# Patient Record
Sex: MALE | Race: WHITE | NOT HISPANIC OR LATINO | URBAN - METROPOLITAN AREA
[De-identification: names, ages, dates, MRNs, and addresses within clinical notes are randomized per-mention and may not be internally consistent; named-entity substitution may affect disease eponyms.]

---

## 2021-06-29 ENCOUNTER — PATIENT OUTREACH (OUTPATIENT)
Dept: HEALTH INFORMATION MANAGEMENT | Facility: OTHER | Age: 81
End: 2021-06-29

## 2021-06-29 NOTE — PROGRESS NOTES
Outbound call to PT- Msg via MS Teams that PT returned my call while I was on another call.  Warm transfer to [facility] for ADELSO home visit.

## 2021-06-29 NOTE — PROGRESS NOTES
Outcome: Left Message    Called PT to schedule ADELSO.   LVM requesting a call back.    Please transfer to Patient Outreach Team at [phone] when patient returns call.      Attempt # 1

## 2021-08-03 PROBLEM — M19.90 ARTHRITIS: Status: ACTIVE | Noted: 2021-08-03

## 2021-12-29 ENCOUNTER — TELEPHONE (OUTPATIENT)
Dept: HEALTH INFORMATION MANAGEMENT | Facility: OTHER | Age: 81
End: 2021-12-29

## 2021-12-29 NOTE — TELEPHONE ENCOUNTER
TCN to member today regarding recent admission. Stated he was doing pretty good regarding the stroke. Some slight weakness to his right side still. He has seen his PCP although was also tested for COVID due to symptoms but was negative. Feeling better now just no worsening of cold symptoms. Understood DC instructions, no questions or concerns. Instructed him to call HTH/SCP if he has any needs or questions in the future he can ask for a .

## 2021-12-30 ENCOUNTER — TELEPHONE (OUTPATIENT)
Dept: HEALTH INFORMATION MANAGEMENT | Facility: OTHER | Age: 81
End: 2021-12-30

## 2022-05-19 PROBLEM — D69.2 PURPURA (HCC): Status: ACTIVE | Noted: 2022-05-19

## 2022-05-19 PROBLEM — D68.59 HYPERCOAGULABLE STATE (HCC): Status: ACTIVE | Noted: 2022-05-19

## 2022-05-19 PROBLEM — I48.0 PAROXYSMAL ATRIAL FIBRILLATION (HCC): Status: ACTIVE | Noted: 2022-05-19

## 2022-12-02 ENCOUNTER — DOCUMENTATION (OUTPATIENT)
Dept: HEALTH INFORMATION MANAGEMENT | Facility: OTHER | Age: 82
End: 2022-12-02

## 2023-05-19 ENCOUNTER — HOME HEALTH ADMISSION (OUTPATIENT)
Dept: HOME HEALTH SERVICES | Facility: HOME HEALTHCARE | Age: 83
End: 2023-05-19
Payer: MEDICARE

## 2023-05-24 ENCOUNTER — HOME CARE VISIT (OUTPATIENT)
Dept: HOME HEALTH SERVICES | Facility: HOME HEALTHCARE | Age: 83
End: 2023-05-24
Payer: MEDICARE

## 2023-05-24 PROCEDURE — 665001 SOC-HOME HEALTH

## 2023-05-24 PROCEDURE — G0493 RN CARE EA 15 MIN HH/HOSPICE: HCPCS

## 2023-05-24 ASSESSMENT — ENCOUNTER SYMPTOMS
LAST BOWEL MOVEMENT: 66618
PAIN: 1
HYPERTENSION: 1
VOMITING: DENIES
DYSPNEA ON EXERTION: 1
PERSON REPORTING PAIN: PATIENT
PALPITATIONS: 1
NAUSEA: DENIES
FATIGUES EASILY: 1

## 2023-05-25 ENCOUNTER — HOME CARE VISIT (OUTPATIENT)
Dept: HOME HEALTH SERVICES | Facility: HOME HEALTHCARE | Age: 83
End: 2023-05-25
Payer: MEDICARE

## 2023-05-25 VITALS
HEART RATE: 77 BPM | DIASTOLIC BLOOD PRESSURE: 86 MMHG | RESPIRATION RATE: 16 BRPM | SYSTOLIC BLOOD PRESSURE: 130 MMHG | OXYGEN SATURATION: 99 % | TEMPERATURE: 97.4 F

## 2023-05-25 VITALS
TEMPERATURE: 97.7 F | DIASTOLIC BLOOD PRESSURE: 72 MMHG | HEART RATE: 82 BPM | OXYGEN SATURATION: 96 % | RESPIRATION RATE: 16 BRPM | SYSTOLIC BLOOD PRESSURE: 135 MMHG

## 2023-05-25 PROCEDURE — G0151 HHCP-SERV OF PT,EA 15 MIN: HCPCS

## 2023-05-25 SDOH — ECONOMIC STABILITY: HOUSING INSECURITY
HOME SAFETY: PT RECOMMENDED REMOVAL OF THROW RUGS IN BATHROOM AND GRAB BAR/RAILING AT STEPS FOR FALL PREVENTION.  PATIENT ABLE TO SPEAKBACK VERBAL UNDERSTANDING

## 2023-05-25 ASSESSMENT — ENCOUNTER SYMPTOMS
PAIN LOCATION - PAIN SEVERITY: 3/10
SUBJECTIVE PAIN PROGRESSION: WAXING AND WANING
PAIN LOCATION - PAIN QUALITY: DULL ACHY SHARP
PAIN LOCATION - PAIN FREQUENCY: FREQUENT
PAIN LOCATION - PAIN SEVERITY: 0/10
PAIN LOCATION: RIGHT HIP
PAIN LOCATION - RELIEVING FACTORS: REST, PAIN MEDICATION
PAIN LOCATION - PAIN FREQUENCY: INTERMITTENT
PAIN LOCATION - PAIN DURATION: CHRONIC
PERSON REPORTING PAIN: PATIENT
PAIN LOCATION - PAIN DURATION: DAILY
HIGHEST PAIN SEVERITY IN PAST 24 HOURS: 5/10
PAIN SEVERITY GOAL: 1/10
PAIN LOCATION - PAIN QUALITY: ACHY
PAIN LOCATION: RT HIP
PAIN LOCATION - EXACERBATING FACTORS: WEIGHT BEARING ACTIVITY
LOWEST PAIN SEVERITY IN PAST 24 HOURS: 2/10
SUBJECTIVE PAIN PROGRESSION: GRADUALLY IMPROVING
HIGHEST PAIN SEVERITY IN PAST 24 HOURS: 3/10
LOWEST PAIN SEVERITY IN PAST 24 HOURS: 0/10
PAIN: 1
PAIN SEVERITY GOAL: 3/10

## 2023-05-25 ASSESSMENT — ACTIVITIES OF DAILY LIVING (ADL)
BATHING_CURRENT_FUNCTION: MODERATE ASSIST
AMBULATION ASSISTANCE: MINIMUM ASSIST
AMBULATION ASSISTANCE ON FLAT SURFACES: 1
AMBULATION ASSISTANCE: 1
AMBULATION_DISTANCE/DURATION_TOLERATED: 100 FEET
BATHING ASSESSED: 1

## 2023-05-25 NOTE — CASE COMMUNICATION
PT evaluation completed on 5/25/23.  Frequency:   1 week 1, 2 week 3,  Effective 5/25/23.  Please assist with authorization as needed.

## 2023-05-26 NOTE — CASE COMMUNICATION
Kayy- please forward this message to pts PCP  PRIMARY DIAGNOSIS: rt hip  SKILLED NEED: fall prevention, medication management, health assessment, wound care    NURSING FREQUENCy: 1w1, 3prn  ZIPE CODE: [id number]  DISCIPLINES ORDERED: PT OT  INSURANCE: [facility]  CERTIFICATION PERIOD: 5/24-7/22  SPECIAL CONSIDERATION: none  Case communication to HH attending provider and P jose anti    Opened patient to [facility] medication reconcili ation process done. Medication list left in home care folder. See MAR for drug allergy interactions. There are no major drug to drug interactions.  The best contact phone number is [phone] and pt manages the medications.

## 2023-05-30 ENCOUNTER — HOME CARE VISIT (OUTPATIENT)
Dept: HOME HEALTH SERVICES | Facility: HOME HEALTHCARE | Age: 83
End: 2023-05-30
Payer: MEDICARE

## 2023-05-30 VITALS
TEMPERATURE: 97.5 F | OXYGEN SATURATION: 98 % | RESPIRATION RATE: 16 BRPM | DIASTOLIC BLOOD PRESSURE: 72 MMHG | HEART RATE: 77 BPM | SYSTOLIC BLOOD PRESSURE: 128 MMHG

## 2023-05-30 PROBLEM — Z96.641 STATUS POST TOTAL REPLACEMENT OF RIGHT HIP: Status: ACTIVE | Noted: 2023-05-30

## 2023-05-30 PROBLEM — L89.151 PRESSURE INJURY OF SACRAL REGION, STAGE 1: Status: ACTIVE | Noted: 2023-05-30

## 2023-05-30 PROCEDURE — G0151 HHCP-SERV OF PT,EA 15 MIN: HCPCS

## 2023-05-30 ASSESSMENT — ENCOUNTER SYMPTOMS
PAIN LOCATION - PAIN DURATION: DAILY
PAIN LOCATION - PAIN QUALITY: ACHING
HIGHEST PAIN SEVERITY IN PAST 24 HOURS: 4/10
PAIN LOCATION - RELIEVING FACTORS: PAIN MEDS, REST, REPOSITIONING
PAIN LOCATION - PAIN FREQUENCY: INTERMITTENT
PERSON REPORTING PAIN: PATIENT
PAIN: 1
SUBJECTIVE PAIN PROGRESSION: WAXING AND WANING
LOWEST PAIN SEVERITY IN PAST 24 HOURS: 0/10
PAIN SEVERITY GOAL: 0/10
PAIN LOCATION - PAIN SEVERITY: 4/10
PAIN LOCATION: RIGHT HIP

## 2023-06-01 ENCOUNTER — HOME CARE VISIT (OUTPATIENT)
Dept: HOME HEALTH SERVICES | Facility: HOME HEALTHCARE | Age: 83
End: 2023-06-01
Payer: MEDICARE

## 2023-06-01 VITALS
RESPIRATION RATE: 16 BRPM | HEART RATE: 72 BPM | DIASTOLIC BLOOD PRESSURE: 64 MMHG | OXYGEN SATURATION: 98 % | TEMPERATURE: 97.5 F | SYSTOLIC BLOOD PRESSURE: 102 MMHG

## 2023-06-01 PROCEDURE — G0152 HHCP-SERV OF OT,EA 15 MIN: HCPCS

## 2023-06-01 PROCEDURE — G0151 HHCP-SERV OF PT,EA 15 MIN: HCPCS

## 2023-06-01 ASSESSMENT — ENCOUNTER SYMPTOMS
PAIN LOCATION - PAIN QUALITY: ACHING
PAIN LOCATION: RIGHT LEG
PAIN LOCATION - PAIN SEVERITY: 3/10
PAIN LOCATION - PAIN DURATION: DAILY
PAIN LOCATION - PAIN FREQUENCY: INTERMITTENT
PERSON REPORTING PAIN: PATIENT
PAIN LOCATION - RELIEVING FACTORS: PAIN MEDS, REST, REPOSITIONING
PAIN: 1
PAIN LOCATION - EXACERBATING FACTORS: STANDING, WALKING

## 2023-06-01 ASSESSMENT — ACTIVITIES OF DAILY LIVING (ADL): OASIS_M1830: 03

## 2023-06-01 NOTE — CASE COMMUNICATION
"Quality Review for SOC OASIS by SUMI Cramer RN on  June 1, 2023     Edits completed by SUMI Cramer RN:  1.  and  diagnosis coding updated per chart review.  2.  patient living situation, changed availability of assistance to occasional/ short term assistance per your documentation.  3. , ethnicity changed to \"patient declines to respond\" as \"patient unable to respond implies that the patient is unable to comm unicate  4. Per narrative that patient needs min to moderate assistance and a walker for safety, the following changes were made: , , , ,  are 2;  and  are 3  5. Functional limitations, checked incontinence  6. Safety measures checked ambulate only with assistance  7.  is 8 per care plan therapy sets.   "

## 2023-06-01 NOTE — CASE COMMUNICATION
"i agree with this  ----- Message -----  From: Little Cramer R.N.  Sent: 6/1/2023   7:21 AM PDT  To: Jazmin Pinzon R.N.      Quality Review for SOC OASIS by SUMI Cramer RN on  June 1, 2023     Edits completed by SUMI Cramer RN:  1.  and  diagnosis coding updated per chart review.  2.  patient living situation, changed availability of assistance to occasional/ short term assistance per your documentati on.  3. , ethnicity changed to \"patient declines to respond\" as \"patient unable to respond implies that the patient is unable to communicate  4. Per narrative that patient needs min to moderate assistance and a walker for safety, the following changes were made: , , , ,  are 2;  and  are 3  5. Functional limitations, checked incontinence  6. Safety measures checked ambulate only with assistance  7 .  is 8 per care plan therapy sets.   "

## 2023-06-04 ASSESSMENT — ACTIVITIES OF DAILY LIVING (ADL)
TOILETING: INDEPENDENT
HOUSEKEEPING ASSESSED: 1
DRESSING_LB_CURRENT_FUNCTION: INDEPENDENT
FEEDING_WITHIN_DEFINED_LIMITS: 1
TOILETING: 1
GROOMING ASSESSED: 1
GROOMING_WITHIN_DEFINED_LIMITS: 1
CURRENT_FUNCTION: INDEPENDENT
AMBULATION ASSISTANCE: 1
FEEDING: INDEPENDENT
FEEDING ASSESSED: 1
ORAL_CARE_CURRENT_FUNCTION: INDEPENDENT
LIGHT HOUSEKEEPING: NEEDS ASSISTANCE
PHYSICAL TRANSFERS ASSESSED: 1
SHOPPING: NEEDS ASSISTANCE
LAUNDRY: NEEDS ASSISTANCE
AMBULATION ASSISTANCE: INDEPENDENT
PREPARING MEALS: INDEPENDENT
DRESSING_UB_CURRENT_FUNCTION: INDEPENDENT
LAUNDRY ASSESSED: 1
SHOPPING ASSESSED: 1
ORAL_CARE_ASSESSED: 1
GROOMING_CURRENT_FUNCTION: INDEPENDENT

## 2023-06-04 ASSESSMENT — ENCOUNTER SYMPTOMS
DENIES PAIN: 1
PERSON REPORTING PAIN: PATIENT

## 2023-06-05 ENCOUNTER — HOME CARE VISIT (OUTPATIENT)
Dept: HOME HEALTH SERVICES | Facility: HOME HEALTHCARE | Age: 83
End: 2023-06-05
Payer: MEDICARE

## 2023-06-05 VITALS
OXYGEN SATURATION: 94 % | SYSTOLIC BLOOD PRESSURE: 110 MMHG | DIASTOLIC BLOOD PRESSURE: 60 MMHG | RESPIRATION RATE: 16 BRPM | TEMPERATURE: 97.7 F | HEART RATE: 76 BPM

## 2023-06-05 PROCEDURE — G0152 HHCP-SERV OF OT,EA 15 MIN: HCPCS

## 2023-06-05 PROCEDURE — G0151 HHCP-SERV OF PT,EA 15 MIN: HCPCS

## 2023-06-05 ASSESSMENT — ENCOUNTER SYMPTOMS
PAIN LOCATION - PAIN QUALITY: ACHING
PAIN LOCATION - EXACERBATING FACTORS: STANDING, WALKING
PAIN LOCATION - PAIN FREQUENCY: INTERMITTENT
LOWEST PAIN SEVERITY IN PAST 24 HOURS: 0/10
PAIN LOCATION - RELIEVING FACTORS: PAIN MEDS, REST, REPOSITIONING
PAIN LOCATION: RIGHT HIP
PAIN LOCATION - PAIN SEVERITY: 2/10
PAIN LOCATION - PAIN DURATION: DAILY
HIGHEST PAIN SEVERITY IN PAST 24 HOURS: 4/10
PAIN SEVERITY GOAL: 0/10
PERSON REPORTING PAIN: PATIENT
PAIN: 1

## 2023-06-07 VITALS
DIASTOLIC BLOOD PRESSURE: 60 MMHG | OXYGEN SATURATION: 94 % | SYSTOLIC BLOOD PRESSURE: 110 MMHG | HEART RATE: 76 BPM | RESPIRATION RATE: 16 BRPM

## 2023-06-07 ASSESSMENT — ENCOUNTER SYMPTOMS
PERSON REPORTING PAIN: PATIENT
DENIES PAIN: 1

## 2023-06-08 ENCOUNTER — HOME CARE VISIT (OUTPATIENT)
Dept: HOME HEALTH SERVICES | Facility: HOME HEALTHCARE | Age: 83
End: 2023-06-08
Payer: MEDICARE

## 2023-06-08 VITALS
HEART RATE: 85 BPM | TEMPERATURE: 97.4 F | OXYGEN SATURATION: 96 % | SYSTOLIC BLOOD PRESSURE: 112 MMHG | RESPIRATION RATE: 16 BRPM | DIASTOLIC BLOOD PRESSURE: 58 MMHG

## 2023-06-08 PROCEDURE — G0151 HHCP-SERV OF PT,EA 15 MIN: HCPCS

## 2023-06-08 ASSESSMENT — ENCOUNTER SYMPTOMS
LOWEST PAIN SEVERITY IN PAST 24 HOURS: 0/10
PAIN SEVERITY GOAL: 0/10
PAIN LOCATION - PAIN SEVERITY: 2/10
PAIN LOCATION - PAIN DURATION: DAILY
SUBJECTIVE PAIN PROGRESSION: WAXING AND WANING
PAIN LOCATION - PAIN FREQUENCY: INTERMITTENT
PAIN LOCATION: RIGHT HIP
PERSON REPORTING PAIN: PATIENT
PAIN LOCATION - RELIEVING FACTORS: PAIN MEDS, REST, REPOSITIONING
PAIN LOCATION - PAIN QUALITY: ACHING
HIGHEST PAIN SEVERITY IN PAST 24 HOURS: 4/10
PAIN: 1
PAIN LOCATION - EXACERBATING FACTORS: STANDING, WALKING

## 2023-06-12 ENCOUNTER — HOME CARE VISIT (OUTPATIENT)
Dept: HOME HEALTH SERVICES | Facility: HOME HEALTHCARE | Age: 83
End: 2023-06-12
Payer: MEDICARE

## 2023-06-12 VITALS
SYSTOLIC BLOOD PRESSURE: 116 MMHG | TEMPERATURE: 97.7 F | DIASTOLIC BLOOD PRESSURE: 62 MMHG | HEART RATE: 75 BPM | OXYGEN SATURATION: 96 % | RESPIRATION RATE: 16 BRPM

## 2023-06-12 PROCEDURE — G0151 HHCP-SERV OF PT,EA 15 MIN: HCPCS

## 2023-06-12 ASSESSMENT — ENCOUNTER SYMPTOMS
PAIN LOCATION - RELIEVING FACTORS: PAIN MEDS, REST, REPOSITIONING
PAIN LOCATION - PAIN DURATION: DAILY
PAIN SEVERITY GOAL: 0/10
PAIN LOCATION: BACK
PAIN LOCATION - EXACERBATING FACTORS: STANDING, WALKING
PAIN: 1
PAIN LOCATION - PAIN QUALITY: ACHING
PAIN LOCATION - PAIN FREQUENCY: INTERMITTENT
SUBJECTIVE PAIN PROGRESSION: WAXING AND WANING
PERSON REPORTING PAIN: PATIENT
LOWEST PAIN SEVERITY IN PAST 24 HOURS: 0/10
PAIN LOCATION - PAIN SEVERITY: 3/10
HIGHEST PAIN SEVERITY IN PAST 24 HOURS: 3/10

## 2023-06-15 ENCOUNTER — HOME CARE VISIT (OUTPATIENT)
Dept: HOME HEALTH SERVICES | Facility: HOME HEALTHCARE | Age: 83
End: 2023-06-15
Payer: MEDICARE

## 2023-06-15 VITALS
OXYGEN SATURATION: 99 % | SYSTOLIC BLOOD PRESSURE: 116 MMHG | DIASTOLIC BLOOD PRESSURE: 62 MMHG | HEART RATE: 72 BPM | TEMPERATURE: 97.7 F | RESPIRATION RATE: 16 BRPM

## 2023-06-15 PROCEDURE — G0151 HHCP-SERV OF PT,EA 15 MIN: HCPCS

## 2023-06-15 ASSESSMENT — ENCOUNTER SYMPTOMS
PAIN LOCATION - PAIN QUALITY: ACHING
PAIN LOCATION - PAIN DURATION: DAILY
PAIN SEVERITY GOAL: 0/10
PAIN LOCATION - RELIEVING FACTORS: PAIN MEDS, REST, REPOSITIONING
PERSON REPORTING PAIN: PATIENT
PAIN LOCATION - PAIN FREQUENCY: INTERMITTENT
PAIN LOCATION - PAIN SEVERITY: 4/10
LOWEST PAIN SEVERITY IN PAST 24 HOURS: 0/10
SUBJECTIVE PAIN PROGRESSION: WAXING AND WANING
HIGHEST PAIN SEVERITY IN PAST 24 HOURS: 5/10
PAIN: 1
PAIN LOCATION - EXACERBATING FACTORS: STANDING, WALKING
PAIN LOCATION: RIGHT HIP

## 2023-06-15 ASSESSMENT — BALANCE ASSESSMENTS
ARISES: 1 - ABLE, USES ARMS TO HELP
NUDGED: 1 - STAGGERS, GRABS, CATCHES SELF
ATTEMPTS TO ARISE: 2 - ABLE TO RISE, ONE ATTEMPT
EYES CLOSED AT MAXIMUM POSITION NUDGED: 0 - UNSTEADY
ARISING SCORE: 1
TURNING 360 DEGREES STEPS: 1 - CONTINUOUS STEPS
SITTING DOWN: 1 - USES ARMS OR NOT SMOOTH MOTION
BALANCE SCORE: 11
SITTING BALANCE: 1 - STEADY, SAFE
STANDING BALANCE: 1 - STEADY BUT WIDE STANCE AND USES CANE OR OTHER SUPPORT
IMMEDIATE STANDING BALANCE FIRST 5 SECONDS: 2 - STEADY WITHOUT WALKER OR OTHER SUPPORT
NUDGED SCORE: 1

## 2023-06-15 ASSESSMENT — ACTIVITIES OF DAILY LIVING (ADL)
HOME_HEALTH_OASIS: 00
OASIS_M1830: 01

## 2023-06-15 ASSESSMENT — PATIENT HEALTH QUESTIONNAIRE - PHQ9: CLINICAL INTERPRETATION OF PHQ2 SCORE: 0

## 2023-06-15 ASSESSMENT — GAIT ASSESSMENTS
STEP SYMMETRY: 1 - RIGHT AND LEFT STEP LENGTH APPEAR EQUAL
TRUNK SCORE: 1
STEP CONTINUITY: 1 - STEPS APPEAR CONTINUOUS
WALKING STANCE: 0 - HEELS APART
BALANCE AND GAIT SCORE: 20
PATH: 1 - MILD/MODERATE DEVIATION OR USES WALKING AID
PATH SCORE: 1
GAIT SCORE: 9
TRUNK: 1 - NO SWAY BUT FLEXION OF KNEES OR BACK OR SPREADS ARMS WHILE WALKING
INITIATION OF GAIT IMMEDIATELY AFTER GO: 1 - NO HESITANCY

## 2023-06-16 ENCOUNTER — HOME CARE VISIT (OUTPATIENT)
Dept: HOME HEALTH SERVICES | Facility: HOME HEALTHCARE | Age: 83
End: 2023-06-16
Payer: MEDICARE

## 2023-06-16 NOTE — CASE COMMUNICATION
Quality Review Completed for DC OASIS by RODRIGUEZ Burgess RN on 6/16/2023:     Edits completed by RODRIGUEZ Burgess RN:  1.  E. Anticoagulant is taking Xarelto with indication per MAR

## 2023-06-16 NOTE — Clinical Note
I agree with these changes.  Thank you.    ----- Message -----  From: Amy Burgess R.N.  Sent: 6/16/2023  11:39 AM PDT  To: Cinthya Ramirez, PT      Quality Review Completed for DC OASIS by RODRIGUEZ Burgess RN on 6/16/2023:     Edits completed by RODRIGUEZ Burgess RN:  1.  E. Anticoagulant is taking Xarelto with indication per MAR

## 2023-10-10 ENCOUNTER — DOCUMENTATION (OUTPATIENT)
Dept: HEALTH INFORMATION MANAGEMENT | Facility: OTHER | Age: 83
End: 2023-10-10
Payer: MEDICARE

## 2024-05-16 ENCOUNTER — HOME HEALTH ADMISSION (OUTPATIENT)
Dept: HOME HEALTH SERVICES | Facility: HOME HEALTHCARE | Age: 84
End: 2024-05-16
Payer: MEDICARE

## 2024-05-23 ENCOUNTER — DOCUMENTATION (OUTPATIENT)
Dept: MEDICAL GROUP | Facility: PHYSICIAN GROUP | Age: 84
End: 2024-05-23
Payer: MEDICARE

## 2024-05-23 ENCOUNTER — HOME CARE VISIT (OUTPATIENT)
Dept: HOME HEALTH SERVICES | Facility: HOME HEALTHCARE | Age: 84
End: 2024-05-23
Payer: MEDICARE

## 2024-05-23 VITALS
TEMPERATURE: 97.7 F | WEIGHT: 187 LBS | HEIGHT: 72 IN | RESPIRATION RATE: 16 BRPM | HEART RATE: 85 BPM | OXYGEN SATURATION: 99 % | DIASTOLIC BLOOD PRESSURE: 68 MMHG | BODY MASS INDEX: 25.33 KG/M2 | SYSTOLIC BLOOD PRESSURE: 116 MMHG

## 2024-05-23 PROCEDURE — 665005 NO-PAY RAP - HOME HEALTH

## 2024-05-23 ASSESSMENT — ENCOUNTER SYMPTOMS
LOWEST PAIN SEVERITY IN PAST 24 HOURS: 0/10
STOOL FREQUENCY: LESS THAN DAILY
PAIN LOCATION: LEFT HIP
PAIN SEVERITY GOAL: 0/10
PAIN LOCATION - EXACERBATING FACTORS: ACTIVITY
VOMITING: DENIES
PAIN LOCATION - PAIN QUALITY: ACHE
CONSTIPATION: 1
PAIN LOCATION - PAIN FREQUENCY: INTERMITTENT
PERSON REPORTING PAIN: PATIENT
HIGHEST PAIN SEVERITY IN PAST 24 HOURS: 6/10
PAIN LOCATION - PAIN DURATION: CHRONIC
LAST BOWEL MOVEMENT: 66982
NAUSEA: DENIES
PAIN: 1
PAIN LOCATION - PAIN SEVERITY: 1/10
SUBJECTIVE PAIN PROGRESSION: GRADUALLY IMPROVING

## 2024-05-23 ASSESSMENT — ACTIVITIES OF DAILY LIVING (ADL): OASIS_M1830: 02

## 2024-05-23 ASSESSMENT — FIBROSIS 4 INDEX: FIB4 SCORE: 3.2

## 2024-05-23 NOTE — PROGRESS NOTES
Medication chart review for [facility] services    Received referral from [facility].   Medications reviewed  compared with discharge summary if available.  Discharge summary date, if applicable:   5/3    Current medication list per [facility]     Medication list one, patient is currently taking    Current Outpatient Medications:     oxyCODONE CR, 10 mg, Oral, 4X/DAY PRN    amoxicillin, 2,000 mg, Oral, QDAY PRN    tamsulosin, 0.4 mg, Oral, DAILY    gabapentin, 300 mg, Oral, TID    bisacodyl, 5 mg, Oral, Q7 DAYS    docusate sodium, 200 mg, Oral, DAILY    Pro Nutrients Fruit & Veggie, 2 Tablet, Oral, DAILY    Probiotic Product (PROBIOTIC BLEND PO), 60 Billion Cells, Oral, DAILY    Glucos-Chondroit-Collag-Hyal (JOINT SUPPORT FORMULA PO), 1 Tablet, Oral, DAILY    Aloe Vera, 10,000 mg, Oral, DAILY    vitamin D3, 1,000 Units, Oral, DAILY    calcium carbonate, 1,000 mg, Oral, 4X/DAY PRN    docosahexanoic acid, 1,400 mg, Oral, DAILY    acetaminophen, 500 mg, Oral, Q6HRS PRN    oxyCODONE-acetaminophen, TAKE 1 TABLET BY MOUTH EVERY 6 HOURS AS NEEDED FOR MODERATE OR SEVERE PAIN FOR UP TO 7 DAYS.    One-A-Day Mens 50+ Advantage, 1 Tablet, Oral, DAILY    Lecithin, 2 Capsule, Oral, BID    vitamin e, 1 Capsule, Oral, DAILY    rivaroxaban, 20 mg, Oral, PM MEAL      Medication list two, drugs that the patient has been prescribed or recommended to take by their healthcare provider on discharge summary  MEDICATIONS:    Include Flomax 0.4 mg p.o. daily, Hui-Colace 8.6-50 mg 2 tablets daily, Xarelto 20 mg p.o. at dinner, gabapentin 200 mg p.o. b.i.d.  He has been placed on oxycodone for pain.       Allergies   Allergen Reactions    Iodine      rash with topical iodine, states has had iodine for contrast without issue       Labs     Lab Results   Component Value Date/Time    SODIUM 142 10/16/2023 05:47 AM    POTASSIUM 4.3 10/16/2023 05:47 AM    CHLORIDE 105 10/16/2023 05:47 AM    CO2 26 10/16/2023 05:47 AM    GLUCOSE 99  10/16/2023 05:47 AM    BUN 15 10/16/2023 05:47 AM    CREATININE 0.65 (L) 10/16/2023 05:47 AM    BUNCREATRAT 23 10/16/2023 05:47 AM    GLOMRATE 99 03/30/2023 09:57 AM     Lab Results   Component Value Date/Time    ALKPHOSPHAT 75 10/16/2023 05:47 AM    ASTSGOT 19 10/16/2023 05:47 AM    ALTSGPT 15 10/16/2023 05:47 AM    TBILIRUBIN 1.0 10/16/2023 05:47 AM    ALBUMIN 4.2 10/16/2023 05:47 AM        Assessment for clinically significant drug interactions, drug omissions/additions, duplicative therapies.            CC   Richard Zhang M.D.  [address] [place] [id number]  Fax: [phone]    [facility]  Phone [phone] fax [phone]    This note was created using voice recognition software (Dragon). The accuracy of the dictation is limited by the abilities of the software. I have reviewed the note prior to signing, however some errors in grammar and context are still possible. If you have any questions related to this note please do not hesitate to contact our office.

## 2024-05-28 ENCOUNTER — HOME CARE VISIT (OUTPATIENT)
Dept: HOME HEALTH SERVICES | Facility: HOME HEALTHCARE | Age: 84
End: 2024-05-28
Payer: MEDICARE

## 2024-05-28 VITALS
HEART RATE: 73 BPM | OXYGEN SATURATION: 98 % | DIASTOLIC BLOOD PRESSURE: 62 MMHG | RESPIRATION RATE: 16 BRPM | TEMPERATURE: 97.4 F | SYSTOLIC BLOOD PRESSURE: 112 MMHG

## 2024-05-28 ASSESSMENT — ACTIVITIES OF DAILY LIVING (ADL)
GROOMING_COMMENTS: SEE OT NOTES
FEEDING_COMMENTS: SEE OT NOTES
BATHING_COMMENTS: SEE OT NOTES
CURRENT_FUNCTION: SUPERVISION
AMBULATION ASSISTANCE: 1
PHYSICAL_TRANSFERS_DEVICES: USE OF B UES TO ASSIST
PHYSICAL TRANSFERS ASSESSED: 1
AMBULATION ASSISTANCE ON FLAT SURFACES: 1
AMBULATION ASSISTANCE: STAND BY ASSIST

## 2024-05-28 ASSESSMENT — GAIT ASSESSMENTS
STEP SYMMETRY: 1 - RIGHT AND LEFT STEP LENGTH APPEAR EQUAL
TRUNK: 0 - MARKED SWAY OR USES WALKING AID
TRUNK SCORE: 0
PATH: 1 - MILD/MODERATE DEVIATION OR USES WALKING AID
STEP CONTINUITY: 1 - STEPS APPEAR CONTINUOUS
GAIT SCORE: 8
INITIATION OF GAIT IMMEDIATELY AFTER GO: 1 - NO HESITANCY
BALANCE AND GAIT SCORE: 17
PATH SCORE: 1
WALKING STANCE: 0 - HEELS APART

## 2024-05-28 ASSESSMENT — ENCOUNTER SYMPTOMS
PAIN: 1
LOWEST PAIN SEVERITY IN PAST 24 HOURS: 0/10
PAIN LOCATION - RELIEVING FACTORS: PAIN MEDS, REST, REPOSITIONING
PAIN LOCATION - PAIN QUALITY: ACHING
PAIN LOCATION - PAIN DURATION: DAILY
MUSCLE WEAKNESS: 1
PERSON REPORTING PAIN: PATIENT
PAIN LOCATION - PAIN SEVERITY: 1/10
PAIN LOCATION: LEFT HIP
LIMITED RANGE OF MOTION: 1
PAIN LOCATION - EXACERBATING FACTORS: STANDING OR WALKING TOO MUCH
HIGHEST PAIN SEVERITY IN PAST 24 HOURS: 3/10
PAIN SEVERITY GOAL: 0/10
SUBJECTIVE PAIN PROGRESSION: WAXING AND WANING
PAIN LOCATION - PAIN FREQUENCY: INTERMITTENT

## 2024-05-28 ASSESSMENT — BALANCE ASSESSMENTS
TURNING 360 DEGREES STEPS: 1 - CONTINUOUS STEPS
EYES CLOSED AT MAXIMUM POSITION NUDGED: 0 - UNSTEADY
NUDGED: 1 - STAGGERS, GRABS, CATCHES SELF
BALANCE SCORE: 9
NUDGED SCORE: 1
ARISING SCORE: 1
ATTEMPTS TO ARISE: 2 - ABLE TO RISE, ONE ATTEMPT
STANDING BALANCE: 1 - STEADY BUT WIDE STANCE AND USES CANE OR OTHER SUPPORT
IMMEDIATE STANDING BALANCE FIRST 5 SECONDS: 1 - STEADY BUT USES WALKER OR OTHER SUPPORT
ARISES: 1 - ABLE, USES ARMS TO HELP
SITTING BALANCE: 1 - STEADY, SAFE
SITTING DOWN: 1 - USES ARMS OR NOT SMOOTH MOTION

## 2024-05-29 ENCOUNTER — HOME CARE VISIT (OUTPATIENT)
Dept: HOME HEALTH SERVICES | Facility: HOME HEALTHCARE | Age: 84
End: 2024-05-29
Payer: MEDICARE

## 2024-05-29 VITALS
SYSTOLIC BLOOD PRESSURE: 126 MMHG | RESPIRATION RATE: 16 BRPM | DIASTOLIC BLOOD PRESSURE: 68 MMHG | OXYGEN SATURATION: 97 % | HEART RATE: 83 BPM | TEMPERATURE: 97.5 F

## 2024-05-29 ASSESSMENT — ACTIVITIES OF DAILY LIVING (ADL)
FEEDING: INDEPENDENT
ORAL_CARE_CURRENT_FUNCTION: INDEPENDENT
AMBULATION ASSISTANCE: 1
LAUNDRY: INDEPENDENT
LAUNDRY ASSESSED: 1
GROOMING_CURRENT_FUNCTION: INDEPENDENT
AMBULATION ASSISTANCE: SUPERVISION
DRESSING_LB_CURRENT_FUNCTION: INDEPENDENT
TOILETING: 1
LIGHT HOUSEKEEPING: NEEDS ASSISTANCE
ORAL_CARE_ASSESSED: 1
FEEDING_WITHIN_DEFINED_LIMITS: 1
BATHING_CURRENT_FUNCTION: INDEPENDENT
GROOMING_WITHIN_DEFINED_LIMITS: 1
TOILETING: INDEPENDENT
PHYSICAL TRANSFERS ASSESSED: 1
GROOMING ASSESSED: 1
FEEDING ASSESSED: 1
BATHING ASSESSED: 1
HOUSEKEEPING ASSESSED: 1
DRESSING_UB_CURRENT_FUNCTION: INDEPENDENT
PREPARING MEALS: INDEPENDENT
CURRENT_FUNCTION: INDEPENDENT

## 2024-05-29 ASSESSMENT — ENCOUNTER SYMPTOMS
PAIN: 1
PAIN LOCATION: LEFT HIP
PAIN LOCATION - EXACERBATING FACTORS: MOVEMENT
PAIN LOCATION - RELIEVING FACTORS: REST
PAIN LOCATION - PAIN SEVERITY: 3/10
PAIN LOCATION - PAIN QUALITY: ACHING
PAIN LOCATION - PAIN FREQUENCY: FREQUENT
PAIN LOCATION - PAIN DURATION: A MONTH
PERSON REPORTING PAIN: PATIENT

## 2024-06-04 ENCOUNTER — HOME CARE VISIT (OUTPATIENT)
Dept: HOME HEALTH SERVICES | Facility: HOME HEALTHCARE | Age: 84
End: 2024-06-04
Payer: MEDICARE

## 2024-06-04 VITALS
OXYGEN SATURATION: 96 % | DIASTOLIC BLOOD PRESSURE: 52 MMHG | TEMPERATURE: 97.9 F | SYSTOLIC BLOOD PRESSURE: 96 MMHG | HEART RATE: 72 BPM | RESPIRATION RATE: 16 BRPM

## 2024-06-04 PROCEDURE — G0151 HHCP-SERV OF PT,EA 15 MIN: HCPCS

## 2024-06-04 ASSESSMENT — ENCOUNTER SYMPTOMS
PERSON REPORTING PAIN: PATIENT
PAIN LOCATION - EXACERBATING FACTORS: STANDING OR WALKING TOO LONG
PAIN LOCATION - PAIN QUALITY: ACHING
PAIN: 1
PAIN LOCATION - PAIN DURATION: DAILY
PAIN LOCATION - RELIEVING FACTORS: PAIN MEDS, REST, REPOSITIONING
PAIN LOCATION: LEFT HIP
HIGHEST PAIN SEVERITY IN PAST 24 HOURS: 4/10
PAIN LOCATION - PAIN SEVERITY: 2/10
PAIN LOCATION - PAIN FREQUENCY: INTERMITTENT
LOWEST PAIN SEVERITY IN PAST 24 HOURS: 0/10
PAIN SEVERITY GOAL: 0/10

## 2024-06-05 ENCOUNTER — HOME CARE VISIT (OUTPATIENT)
Dept: HOME HEALTH SERVICES | Facility: HOME HEALTHCARE | Age: 84
End: 2024-06-05
Payer: MEDICARE

## 2024-06-05 NOTE — CASE COMMUNICATION
Ivis, please forward to PMD  IDTPatient discussed today in interdisciplinary team meeting. Current plan meets patient needs.

## 2024-06-06 ENCOUNTER — HOME CARE VISIT (OUTPATIENT)
Dept: HOME HEALTH SERVICES | Facility: HOME HEALTHCARE | Age: 84
End: 2024-06-06
Payer: MEDICARE

## 2024-06-06 VITALS
HEART RATE: 81 BPM | SYSTOLIC BLOOD PRESSURE: 104 MMHG | TEMPERATURE: 97.4 F | OXYGEN SATURATION: 96 % | RESPIRATION RATE: 16 BRPM | DIASTOLIC BLOOD PRESSURE: 56 MMHG

## 2024-06-06 PROCEDURE — G0151 HHCP-SERV OF PT,EA 15 MIN: HCPCS

## 2024-06-06 ASSESSMENT — ENCOUNTER SYMPTOMS
PAIN LOCATION - EXACERBATING FACTORS: STANDING OR WALKING TOO MUCH
PAIN SEVERITY GOAL: 0/10
LOWEST PAIN SEVERITY IN PAST 24 HOURS: 0/10
PAIN LOCATION - PAIN FREQUENCY: INTERMITTENT
PAIN: 1
HIGHEST PAIN SEVERITY IN PAST 24 HOURS: 4/10
PAIN LOCATION - PAIN SEVERITY: 3/10
PAIN LOCATION - RELIEVING FACTORS: PAIN MEDS, REST, REPOSITIONING
PAIN LOCATION - PAIN DURATION: DAILY
PAIN LOCATION: GENERALIZED
PERSON REPORTING PAIN: PATIENT
PAIN LOCATION - PAIN QUALITY: ACHING

## 2024-06-08 ENCOUNTER — HOME CARE VISIT (OUTPATIENT)
Dept: HOME HEALTH SERVICES | Facility: HOME HEALTHCARE | Age: 84
End: 2024-06-08
Payer: MEDICARE

## 2024-06-08 NOTE — CASE COMMUNICATION
Quality Review for 5.23.24 SOC OASIS performed on by ROBERT Pedroza RN on 6.9.2024:.     Edits completed by ROBERT Pedroza RN:  1.  and  dx coding updated per chart review.   2. Changed  to 5.22.24 per the DC date  3. Changed , , ,  to 2,  to 3, [id number] C, F-H to 3, [id number] D-K, P to 3 per narrative pt needs min assist with ADLs  4. Changed  to 3 per ambulation score   5. Added ambulate only with assista nce to safety measures  6. Changed  to 6

## 2024-06-10 ENCOUNTER — HOME CARE VISIT (OUTPATIENT)
Dept: HOME HEALTH SERVICES | Facility: HOME HEALTHCARE | Age: 84
End: 2024-06-10
Payer: MEDICARE

## 2024-06-10 VITALS
DIASTOLIC BLOOD PRESSURE: 62 MMHG | TEMPERATURE: 97.7 F | SYSTOLIC BLOOD PRESSURE: 118 MMHG | RESPIRATION RATE: 16 BRPM | HEART RATE: 73 BPM | OXYGEN SATURATION: 98 %

## 2024-06-10 PROCEDURE — G0151 HHCP-SERV OF PT,EA 15 MIN: HCPCS

## 2024-06-10 ASSESSMENT — ENCOUNTER SYMPTOMS
PAIN LOCATION - PAIN QUALITY: ACHING
PERSON REPORTING PAIN: PATIENT
PAIN SEVERITY GOAL: 0/10
SUBJECTIVE PAIN PROGRESSION: WAXING AND WANING
HIGHEST PAIN SEVERITY IN PAST 24 HOURS: 4/10
PAIN LOCATION - PAIN DURATION: DAILY
PAIN: 1
LOWEST PAIN SEVERITY IN PAST 24 HOURS: 0/10
PAIN LOCATION: LEFT HIP
PAIN LOCATION - PAIN SEVERITY: 3/10
PAIN LOCATION - PAIN FREQUENCY: INTERMITTENT
PAIN LOCATION - RELIEVING FACTORS: PAIN MEDS, REST, REPOSITIONING
PAIN LOCATION - EXACERBATING FACTORS: STANDING OR WALKING TOO LONG

## 2024-06-10 NOTE — CASE COMMUNICATION
I agree with these changes.  ----- Message -----  From: Isela Pedroza R.N.  Sent: 6/8/2024   1:02 PM PDT  To: Vinita Stanton R.N.      Quality Review for 5.23.24 SOC OASIS performed on by ROBERT Pedroza RN on 6.9.2024:.     Edits completed by ROBERT Pedroza RN:  1.  and  dx coding updated per chart review.   2. Changed  to 5.22.24 per the DC date  3. Changed , , ,  to 2,  to 3, [id number] C, F-H to 3, [id number]  D-K, P to 3 per narrative pt needs min assist with ADLs  4. Changed  to 3 per ambulation score   5. Added ambulate only with assistance to safety measures  6. Changed  to 6

## 2024-06-13 ENCOUNTER — HOME CARE VISIT (OUTPATIENT)
Dept: HOME HEALTH SERVICES | Facility: HOME HEALTHCARE | Age: 84
End: 2024-06-13
Payer: MEDICARE

## 2024-06-13 VITALS
HEART RATE: 74 BPM | RESPIRATION RATE: 16 BRPM | TEMPERATURE: 97.6 F | DIASTOLIC BLOOD PRESSURE: 60 MMHG | OXYGEN SATURATION: 96 % | SYSTOLIC BLOOD PRESSURE: 112 MMHG

## 2024-06-13 PROCEDURE — G0151 HHCP-SERV OF PT,EA 15 MIN: HCPCS

## 2024-06-13 ASSESSMENT — ENCOUNTER SYMPTOMS
PAIN: 1
LOWEST PAIN SEVERITY IN PAST 24 HOURS: 0/10
PERSON REPORTING PAIN: PATIENT
PAIN LOCATION - RELIEVING FACTORS: PAIN MEDS, REST, REPOSITIONING
PAIN LOCATION - PAIN QUALITY: ACHING
PAIN LOCATION - PAIN SEVERITY: 2/10
HIGHEST PAIN SEVERITY IN PAST 24 HOURS: 5/10
PAIN LOCATION - EXACERBATING FACTORS: STANDING OR WALKING TOO MUCH
SUBJECTIVE PAIN PROGRESSION: WAXING AND WANING
PAIN LOCATION: LEFT HIP
PAIN LOCATION - PAIN DURATION: DAILY
PAIN SEVERITY GOAL: 0/10
PAIN LOCATION - PAIN FREQUENCY: INTERMITTENT

## 2024-06-13 ASSESSMENT — ACTIVITIES OF DAILY LIVING (ADL)
OASIS_M1830: 01
AMBULATION ASSISTANCE ON FLAT SURFACES: 1
AMBULATION ASSISTANCE ON UNEVEN SURFACES: 1
HOME_HEALTH_OASIS: 00

## 2024-06-13 ASSESSMENT — BALANCE ASSESSMENTS
BALANCE SCORE: 11
EYES CLOSED AT MAXIMUM POSITION NUDGED: 1 - STEADY
STANDING BALANCE: 1 - STEADY BUT WIDE STANCE AND USES CANE OR OTHER SUPPORT
TURNING 360 DEGREES STEPS: 1 - CONTINUOUS STEPS
ATTEMPTS TO ARISE: 2 - ABLE TO RISE, ONE ATTEMPT
ARISES: 1 - ABLE, USES ARMS TO HELP
NUDGED: 1 - STAGGERS, GRABS, CATCHES SELF
NUDGED SCORE: 1
IMMEDIATE STANDING BALANCE FIRST 5 SECONDS: 1 - STEADY BUT USES WALKER OR OTHER SUPPORT
SITTING BALANCE: 1 - STEADY, SAFE
SITTING DOWN: 1 - USES ARMS OR NOT SMOOTH MOTION
ARISING SCORE: 1

## 2024-06-13 ASSESSMENT — GAIT ASSESSMENTS
GAIT SCORE: 8
TRUNK SCORE: 0
STEP SYMMETRY: 1 - RIGHT AND LEFT STEP LENGTH APPEAR EQUAL
PATH SCORE: 1
WALKING STANCE: 0 - HEELS APART
BALANCE AND GAIT SCORE: 19
TRUNK: 0 - MARKED SWAY OR USES WALKING AID
INITIATION OF GAIT IMMEDIATELY AFTER GO: 1 - NO HESITANCY
PATH: 1 - MILD/MODERATE DEVIATION OR USES WALKING AID
STEP CONTINUITY: 1 - STEPS APPEAR CONTINUOUS

## 2024-06-13 ASSESSMENT — PATIENT HEALTH QUESTIONNAIRE - PHQ9: CLINICAL INTERPRETATION OF PHQ2 SCORE: 0

## 2024-06-21 ENCOUNTER — HOME CARE VISIT (OUTPATIENT)
Dept: HOME HEALTH SERVICES | Facility: HOME HEALTHCARE | Age: 84
End: 2024-06-21
Payer: MEDICARE

## 2024-06-21 DIAGNOSIS — M19.90 ARTHRITIS: ICD-10-CM

## 2024-06-21 DIAGNOSIS — I48.0 PAROXYSMAL ATRIAL FIBRILLATION (HCC): ICD-10-CM

## 2024-06-21 NOTE — Clinical Note
I agree with these changes.  Thank you.    ----- Message -----  From: Amy Burgess R.N.  Sent: 6/21/2024   7:18 AM PDT  To: Cinthya Ramirez, PT      Quality Review Completed for DC OASIS by RODRIGUEZ Burgess RN on 6/19/2024:     Edits completed by RODRIGUEZ Burgess RN:  1.  F antibiotic is taking with indication per MAR

## 2024-06-21 NOTE — CASE COMMUNICATION
Quality Review Completed for DC OASIS by RODRIGUEZ Burgess RN on 6/19/2024:     Edits completed by RODRIGUEZ Burgess RN:  1.  F antibiotic is taking with indication per MAR

## 2025-04-30 ENCOUNTER — HOME HEALTH ADMISSION (OUTPATIENT)
Dept: HOME HEALTH SERVICES | Facility: HOME HEALTHCARE | Age: 85
End: 2025-04-30
Payer: MEDICARE

## 2025-05-03 ENCOUNTER — TELEPHONE (OUTPATIENT)
Dept: HOME HEALTH SERVICES | Facility: HOME HEALTHCARE | Age: 85
End: 2025-05-03
Payer: MEDICARE

## 2025-05-03 NOTE — TELEPHONE ENCOUNTER
Called patient and let him know about scheduling delay, he stated he has surgery on Thursday. Patient agreeable to a call in a few days. Later start of care for 5/5 per request.

## 2025-05-05 ENCOUNTER — TELEPHONE (OUTPATIENT)
Dept: HOME HEALTH SERVICES | Facility: HOME HEALTHCARE | Age: 85
End: 2025-05-05
Payer: MEDICARE

## 2025-05-05 NOTE — TELEPHONE ENCOUNTER
Spoke with patient, he is having a total knee done on Thursday at noon and should be home Friday. Later start of care for 5/7.

## 2025-05-07 ENCOUNTER — TELEPHONE (OUTPATIENT)
Dept: HOME HEALTH SERVICES | Facility: HOME HEALTHCARE | Age: 85
End: 2025-05-07

## 2025-05-07 ENCOUNTER — TELEPHONE (OUTPATIENT)
Dept: HOME HEALTH SERVICES | Facility: HOME HEALTHCARE | Age: 85
End: 2025-05-07
Payer: MEDICARE

## 2025-05-07 NOTE — TELEPHONE ENCOUNTER
Left message for patient reminding that  will call tomorrow or Friday for weekend visit. Later start of care for 5/8 - no contact  Patient returned call and since his surgery is tomorrow, he requested to please try to call on Friday as he will be home sometime on Friday. If cannot reach him on home or mobile. Please call his daughter Laura. Later start of care for 5/9 per request

## 2025-05-09 ENCOUNTER — TELEPHONE (OUTPATIENT)
Dept: HOME HEALTH SERVICES | Facility: HOME HEALTHCARE | Age: 85
End: 2025-05-09
Payer: MEDICARE

## 2025-05-10 ENCOUNTER — HOME CARE VISIT (OUTPATIENT)
Dept: HOME HEALTH SERVICES | Facility: HOME HEALTHCARE | Age: 85
End: 2025-05-10
Payer: MEDICARE

## 2025-05-10 PROCEDURE — 665005 NO-PAY RAP - HOME HEALTH

## 2025-05-10 PROCEDURE — 665001 SOC-HOME HEALTH

## 2025-05-10 PROCEDURE — G0299 HHS/HOSPICE OF RN EA 15 MIN: HCPCS

## 2025-05-10 PROCEDURE — 665999 HH PPS REVENUE DEBIT

## 2025-05-10 PROCEDURE — 665998 HH PPS REVENUE CREDIT

## 2025-05-10 ASSESSMENT — ENCOUNTER SYMPTOMS
VOMITING: DENIES
FATIGUE: 1
FATIGUES EASILY: 1
PAIN: 1
NAUSEA: DENIES
SHORTNESS OF BREATH: 1
LAST BOWEL MOVEMENT: 67328
CONSTIPATION: 1

## 2025-05-10 ASSESSMENT — FIBROSIS 4 INDEX: FIB4 SCORE: 3.41

## 2025-05-11 ENCOUNTER — HOME CARE VISIT (OUTPATIENT)
Dept: HOME HEALTH SERVICES | Facility: HOME HEALTHCARE | Age: 85
End: 2025-05-11
Payer: MEDICARE

## 2025-05-11 VITALS
SYSTOLIC BLOOD PRESSURE: 124 MMHG | WEIGHT: 190 LBS | TEMPERATURE: 98.5 F | RESPIRATION RATE: 16 BRPM | BODY MASS INDEX: 25.77 KG/M2 | DIASTOLIC BLOOD PRESSURE: 68 MMHG | HEART RATE: 74 BPM | OXYGEN SATURATION: 94 %

## 2025-05-11 PROCEDURE — 665998 HH PPS REVENUE CREDIT

## 2025-05-11 PROCEDURE — 665999 HH PPS REVENUE DEBIT

## 2025-05-11 ASSESSMENT — ENCOUNTER SYMPTOMS
PAIN LOCATION - RELIEVING FACTORS: REST
SUBJECTIVE PAIN PROGRESSION: UNCHANGED
PAIN LOCATION - PAIN SEVERITY: 3/10
PAIN LOCATION: R KNEE
PAIN LOCATION - PAIN FREQUENCY: FREQUENT
PAIN SEVERITY GOAL: 0/10
HIGHEST PAIN SEVERITY IN PAST 24 HOURS: 6/10
LOWEST PAIN SEVERITY IN PAST 24 HOURS: 0/10
PAIN LOCATION - EXACERBATING FACTORS: MOVEMENT

## 2025-05-11 NOTE — CASE COMMUNICATION
noted  ----- Message -----  From: Vidhi Zabala R.N.  Sent: 5/11/2025  10:22 AM PDT  To: Miriam Washington R.N.; Elke Rodriguez R.N.; *      PRIMARY DIAGNOSIS: M17.11 (ICD-10-CM) - Unilateral primary osteoarthritis, right knee   SKILLED NEED: Health assessment, Medication education, education and monitoring of disease processes            NURSING FREQUENCY: 2w1, 1w2, 3 PRN            ZIP CODE: [id number]            DISCIPLINES ORDERED:  S N, HHA, PT, OT            INSURANCE:[facility]            CERTIFICATION PERIOD: 5/10/25 - 7/8/25            SPECIAL CONSIDERATION: Dr Melanie Ash told patient he should have 3 visits a week for 3 week- supposed to have first PT within 5-7 of surgery

## 2025-05-11 NOTE — Clinical Note
Opened patient to [facility] medication reconciliation process done. Medication list left in home care folder. See MAR for drug allergy interactions. There are no major drug to drug interactions.

## 2025-05-11 NOTE — Clinical Note
TREY please see below.     ----- Message -----  From: Vidhi Zabala R.N.  Sent: 5/11/2025  10:22 AM PDT  To: Miriam Washington R.N.; Elke Rodriguez R.N.; *      PRIMARY DIAGNOSIS: M17.11 (ICD-10-CM) - Unilateral primary osteoarthritis, right knee   SKILLED NEED: Health assessment, Medication education, education and monitoring of disease processes            NURSING FREQUENCY: 2w1, 1w2, 3 PRN            ZIP CODE: [id number]            DISCIPLINES ORDERED:  SN, HHA, PT, OT            INSURANCE:[facility]            CERTIFICATION PERIOD: 5/10/25 - 7/8/25            SPECIAL CONSIDERATION: Dr Melanie Ash told patient he should have 3 visits a week for 3 week- supposed to have first PT within 5-7 of surgery

## 2025-05-11 NOTE — Clinical Note
PRIMARY DIAGNOSIS: M17.11 (ICD-10-CM) - Unilateral primary osteoarthritis, right knee   SKILLED NEED: Health assessment, Medication education, education and monitoring of disease processes            NURSING FREQUENCY: 2w1, 1w2, 3 PRN            ZIP CODE: [id number]            DISCIPLINES ORDERED:  SN, HHA, PT, OT            INSURANCE:[facility]            CERTIFICATION PERIOD: 5/10/25 - 7/8/25            SPECIAL CONSIDERATION: Dr Melanie Ash told patient he should have 3 visits a week for 3 week- supposed to have first PT within 5-7 of surgery

## 2025-05-12 ENCOUNTER — DOCUMENTATION (OUTPATIENT)
Dept: MEDICAL GROUP | Facility: PHYSICIAN GROUP | Age: 85
End: 2025-05-12
Payer: MEDICARE

## 2025-05-12 PROCEDURE — 665998 HH PPS REVENUE CREDIT

## 2025-05-12 PROCEDURE — 665999 HH PPS REVENUE DEBIT

## 2025-05-12 NOTE — PROGRESS NOTES
Medication chart review for [facility] services    Received referral from [facility].   Medications reviewed  compared with discharge summary if available.  Discharge summary date, if applicable:   N/a    Current medication list per [facility]     Medication list one, patient is currently taking    Current Outpatient Medications:     artificial tears, 1 Application, Both Eyes, BID PRN    phenylephrine, 1 Spray, Nasal, TID PRN    Biotin, 1 Tablet, Oral, DAILY    finasteride, 5 mg, Oral, DAILY    ROPINIRole, 0.5 mg, Oral, QHS    oxyCODONE-acetaminophen, Take 1 Tablet by mouth. directions on container state to take 1/2 - 2 tablets by mouth every 6 hours PRN  Indications: Pain    amoxicillin, 2,000 mg, Oral, QDAY PRN    gabapentin, 300 mg, Oral, TID    bisacodyl, 5 mg, Oral, Q7 DAYS    docusate sodium, 200 mg, Oral, DAILY    vitamin D3, 1,000 Units, Oral, DAILY    docosahexanoic acid, 1,400 mg, Oral, DAILY    acetaminophen, 500 mg, Oral, Q6HRS PRN    One-A-Day Mens 50+ Advantage, 1 Tablet, Oral, DAILY    Lecithin, 2 Capsule, Oral, BID    vitamin e, 1 Capsule, Oral, DAILY    rivaroxaban, 20 mg, Oral, PM MEAL      Medication list two, drugs that the patient has been prescribed or recommended to take by their healthcare provider on discharge summary  N/a    Allergies   Allergen Reactions    Iodine      rash with topical iodine, states has had iodine for contrast without issue       Labs     Lab Results   Component Value Date/Time    SODIUM 142 10/16/2023 05:47 AM    POTASSIUM 4.3 10/16/2023 05:47 AM    CHLORIDE 105 10/16/2023 05:47 AM    CO2 26 10/16/2023 05:47 AM    GLUCOSE 99 10/16/2023 05:47 AM    BUN 15 10/16/2023 05:47 AM    CREATININE 0.65 (L) 10/16/2023 05:47 AM    BUNCREATRAT 23 10/16/2023 05:47 AM    GLOMRATE 99 03/30/2023 09:57 AM     Lab Results   Component Value Date/Time    ALKPHOSPHAT 75 10/16/2023 05:47 AM    ASTSGOT 19 10/16/2023 05:47 AM    ALTSGPT 15 10/16/2023 05:47 AM    TBILIRUBIN 1.0  10/16/2023 05:47 AM    ALBUMIN 4.2 10/16/2023 05:47 AM        Assessment for clinically significant drug interactions, drug omissions/additions, duplicative therapies.            CC   Richard Zhang M.D.  [address] [place] [id number]  Fax: [phone]    [facility]  Phone [phone] fax [phone]    This note was created using voice recognition software (Dragon). The accuracy of the dictation is limited by the abilities of the software. I have reviewed the note prior to signing, however some errors in grammar and context are still possible. If you have any questions related to this note please do not hesitate to contact our office.       No follow-ups on file.

## 2025-05-13 ENCOUNTER — HOME CARE VISIT (OUTPATIENT)
Dept: HOME HEALTH SERVICES | Facility: HOME HEALTHCARE | Age: 85
End: 2025-05-13
Payer: MEDICARE

## 2025-05-13 VITALS
DIASTOLIC BLOOD PRESSURE: 62 MMHG | OXYGEN SATURATION: 95 % | SYSTOLIC BLOOD PRESSURE: 124 MMHG | HEART RATE: 74 BPM | TEMPERATURE: 97.6 F | RESPIRATION RATE: 16 BRPM

## 2025-05-13 VITALS
OXYGEN SATURATION: 95 % | RESPIRATION RATE: 16 BRPM | SYSTOLIC BLOOD PRESSURE: 124 MMHG | DIASTOLIC BLOOD PRESSURE: 62 MMHG | TEMPERATURE: 97.6 F | HEART RATE: 74 BPM

## 2025-05-13 PROCEDURE — 665999 HH PPS REVENUE DEBIT

## 2025-05-13 PROCEDURE — G0151 HHCP-SERV OF PT,EA 15 MIN: HCPCS

## 2025-05-13 PROCEDURE — G0299 HHS/HOSPICE OF RN EA 15 MIN: HCPCS

## 2025-05-13 PROCEDURE — 665998 HH PPS REVENUE CREDIT

## 2025-05-13 ASSESSMENT — ENCOUNTER SYMPTOMS
PAIN LOCATION - PAIN DURATION: DAILY
LOWEST PAIN SEVERITY IN PAST 24 HOURS: 0/10
PAIN: PAIN LIMITS FUNCTION DAILY NOT CONSTANT
PAIN LOCATION: R KNEE
PAIN LOCATION - PAIN FREQUENCY: FREQUENT
STOOL FREQUENCY: DAILY
PAIN LOCATION - RELIEVING FACTORS: MEDICATION, ICE, REST
PAIN: 1
CONSTIPATION: 1
SUBJECTIVE PAIN PROGRESSION: WAXING AND WANING
DEBILITATING PAIN: 1
FATIGUES EASILY: 1
PAIN LOCATION - PAIN SEVERITY: 1/10
PAIN LOCATION - PAIN FREQUENCY: WITH ACTIVITY
PAIN SEVERITY GOAL: 1/10
SUBJECTIVE PAIN PROGRESSION: UNCHANGED
LOWEST PAIN SEVERITY IN PAST 24 HOURS: 3/10
NAUSEA: DENIES
VOMITING: DENIES
HIGHEST PAIN SEVERITY IN PAST 24 HOURS: 7/10
LAST BOWEL MOVEMENT: 67337
PAIN SEVERITY GOAL: 3/10
ASSOCIATED SYMPTOMS: DENIES
PAIN LOCATION - RELIEVING FACTORS: PAIN MEDICATION
PAIN LOCATION: RIGHT KNEE
PAIN LOCATION - PAIN QUALITY: DULL
PAIN: 1
PAIN LOCATION - PAIN SEVERITY: 3/10
HIGHEST PAIN SEVERITY IN PAST 24 HOURS: 10/10

## 2025-05-13 ASSESSMENT — ACTIVITIES OF DAILY LIVING (ADL)
AMBULATION_DISTANCE/DURATION_TOLERATED: 30 FEET
AMBULATION ASSISTANCE ON FLAT SURFACES: 1

## 2025-05-13 ASSESSMENT — PATIENT HEALTH QUESTIONNAIRE - PHQ9: CLINICAL INTERPRETATION OF PHQ2 SCORE: 0

## 2025-05-13 NOTE — Clinical Note
Patient had fall with minor injury at 9 AM 5/13/2025 when he fell asleep on the toilet.  He stood up left lower extremity did not support his weight and he fell back onto the toilet hitting his head on the left temporal region against the wall causing a small abrasion but no neurological changes

## 2025-05-13 NOTE — Clinical Note
Physical therapy evaluation performed 5/13/2025 and he will be followed 3 times a week for 3 weeks.  Further insurance authorization may be required.

## 2025-05-13 NOTE — Clinical Note
noted  ----- Message -----  From: Crystal Claudio, PT  Sent: 5/13/2025   7:43 PM PDT  To: Miriam Washington R.N.; Elke Rodriguez R.N.; *      Physical therapy evaluation performed 5/13/2025 and he will be followed 3 times a week for 3 weeks.  Further insurance authorization may be required.

## 2025-05-13 NOTE — Clinical Note
noted  ----- Message -----  From: Cyrstal Claudio, PT  Sent: 5/13/2025   7:58 PM PDT  To: Miriam Washington R.N.; Elke Rodriguez R.N.; *      Patient had fall with minor injury at 9 AM 5/13/2025 when he fell asleep on the toilet.  He stood up left lower extremity did not support his weight and he fell back onto the toilet hitting his head on the left temporal region against the wall causing a small abrasion but no neurological changes

## 2025-05-14 ENCOUNTER — HOME CARE VISIT (OUTPATIENT)
Dept: HOME HEALTH SERVICES | Facility: HOME HEALTHCARE | Age: 85
End: 2025-05-14
Payer: MEDICARE

## 2025-05-14 PROCEDURE — G0152 HHCP-SERV OF OT,EA 15 MIN: HCPCS

## 2025-05-14 PROCEDURE — 665998 HH PPS REVENUE CREDIT

## 2025-05-14 PROCEDURE — 665999 HH PPS REVENUE DEBIT

## 2025-05-14 NOTE — HOME HEALTH
PHYSICAL THERAPY EVALUATION AND PLAN OF CARE     ·       Patient: Gera Black     ·       Home Health Admission due to: Recent hospitalization on 5/8/2025 for OA of the right knee with status post right TKA who presents with decreased functional mobility, decreased activity tolerance, decreased right knee range of motion, decreased right lower extremity strength, decreased balance, decreased knowledge of condition management and increased risk for falls.  Patient had a fall earlier in the day      ·       Living Situation/PLOF: Patient lives in a single-story home one-step to enter at the front door.  He lives alone but currently his daughters are staying with him to assist him.  Patient reports that prior to his surgery he was ambulating independently in his home without an assistive device but used an SPC when outside the home.  He was also independent in high/ADLs.  He drove and accessed the community on a regular basis.  He has SPC, FWW, raised toilet seat with handles, reacher, leg , grab bars in his shower, shower stool and a hand-held shower.     ·       Past Medical History: Atrial fibrillation status post ablation, atrial flutter, TIA, free DM, bilateral THAs, multiple back surgeries, BPH, neurogenic pain, osteoporosis, restless leg syndrome      ·       Skilled Therapeutic Need: Decreased activity tolerance, LE weakness, Balance control, Joint ROM, Generalized deconditioning, Gait training, Transfer training, Fall prevention, Knowledge of condition, Pain control and Manual therapy     Recommend skilled HHPT to address deficits and improve function-report sent to MD     ·       Frequency:   3 times a week for 3 weeks,  Effective 5/13/2025    Does the patient get SOB with  exertion?  None noted during evaluation    How often (if at all) does pain interfere with patient's movements?  Limits function daily not constant but not sleep

## 2025-05-15 ENCOUNTER — HOME CARE VISIT (OUTPATIENT)
Dept: HOME HEALTH SERVICES | Facility: HOME HEALTHCARE | Age: 85
End: 2025-05-15
Payer: MEDICARE

## 2025-05-15 VITALS
TEMPERATURE: 98.8 F | OXYGEN SATURATION: 90 % | HEART RATE: 80 BPM | RESPIRATION RATE: 18 BRPM | SYSTOLIC BLOOD PRESSURE: 122 MMHG | DIASTOLIC BLOOD PRESSURE: 60 MMHG

## 2025-05-15 VITALS
OXYGEN SATURATION: 98 % | TEMPERATURE: 97.7 F | SYSTOLIC BLOOD PRESSURE: 108 MMHG | HEART RATE: 72 BPM | RESPIRATION RATE: 16 BRPM | DIASTOLIC BLOOD PRESSURE: 64 MMHG

## 2025-05-15 PROCEDURE — 665998 HH PPS REVENUE CREDIT

## 2025-05-15 PROCEDURE — G0156 HHCP-SVS OF AIDE,EA 15 MIN: HCPCS

## 2025-05-15 PROCEDURE — 665999 HH PPS REVENUE DEBIT

## 2025-05-15 PROCEDURE — G0151 HHCP-SERV OF PT,EA 15 MIN: HCPCS

## 2025-05-15 ASSESSMENT — ENCOUNTER SYMPTOMS
PAIN: 1
PAIN LOCATION - PAIN SEVERITY: 2/10
PAIN LOCATION - PAIN QUALITY: DULL, SHARP
PAIN LOCATION - PAIN SEVERITY: 3/10
PAIN SEVERITY GOAL: 0/10
HIGHEST PAIN SEVERITY IN PAST 24 HOURS: 9/10
LOWEST PAIN SEVERITY IN PAST 24 HOURS: 0/10
SUBJECTIVE PAIN PROGRESSION: UNCHANGED
PAIN LOCATION - PAIN DURATION: SINCE SURGERY
PAIN LOCATION - RELIEVING FACTORS: REST
PAIN LOCATION - EXACERBATING FACTORS: AMBULATION
PAIN LOCATION - PAIN DURATION: DAILY
PAIN LOCATION - PAIN FREQUENCY: FREQUENT
PAIN LOCATION - PAIN FREQUENCY: FREQUENT
HIGHEST PAIN SEVERITY IN PAST 24 HOURS: 10/10
LOWEST PAIN SEVERITY IN PAST 24 HOURS: 0/10
PAIN SEVERITY GOAL: 0/10
SUBJECTIVE PAIN PROGRESSION: WAXING AND WANING
PAIN LOCATION - PAIN QUALITY: ACHING
PAIN LOCATION: R KNEE
PAIN: 1
DEBILITATING PAIN: 1
PAIN LOCATION: RIGHT KNEE
PAIN: PAIN LIMITS FUNCTION DAILY NOT CONSTANT

## 2025-05-15 ASSESSMENT — ACTIVITIES OF DAILY LIVING (ADL)
FEEDING: INDEPENDENT
GROOMING ASSESSED: 1
AMBULATION ASSISTANCE: STAND BY ASSIST
PHYSICAL TRANSFERS ASSESSED: 1
FEEDING ASSESSED: 1
DRESSING_UB_CURRENT_FUNCTION: INDEPENDENT
DRESSING_LB_CURRENT_FUNCTION: STAND BY ASSIST
AMBULATION ASSISTANCE: 1
GROOMING_CURRENT_FUNCTION: INDEPENDENT
TOILETING: STAND BY ASSIST
FEEDING_WITHIN_DEFINED_LIMITS: 1
GROOMING_WITHIN_DEFINED_LIMITS: 1
CURRENT_FUNCTION: INDEPENDENT
TOILETING: 1

## 2025-05-16 ENCOUNTER — HOME CARE VISIT (OUTPATIENT)
Dept: HOME HEALTH SERVICES | Facility: HOME HEALTHCARE | Age: 85
End: 2025-05-16
Payer: MEDICARE

## 2025-05-16 VITALS
SYSTOLIC BLOOD PRESSURE: 122 MMHG | RESPIRATION RATE: 18 BRPM | HEART RATE: 80 BPM | TEMPERATURE: 98.8 F | OXYGEN SATURATION: 90 % | DIASTOLIC BLOOD PRESSURE: 60 MMHG

## 2025-05-16 VITALS
OXYGEN SATURATION: 94 % | SYSTOLIC BLOOD PRESSURE: 92 MMHG | RESPIRATION RATE: 16 BRPM | TEMPERATURE: 98.4 F | DIASTOLIC BLOOD PRESSURE: 58 MMHG | HEART RATE: 89 BPM

## 2025-05-16 VITALS
RESPIRATION RATE: 16 BRPM | OXYGEN SATURATION: 94 % | DIASTOLIC BLOOD PRESSURE: 58 MMHG | HEART RATE: 89 BPM | TEMPERATURE: 98.4 F | SYSTOLIC BLOOD PRESSURE: 92 MMHG

## 2025-05-16 PROCEDURE — 665999 HH PPS REVENUE DEBIT

## 2025-05-16 PROCEDURE — 665998 HH PPS REVENUE CREDIT

## 2025-05-16 PROCEDURE — G0152 HHCP-SERV OF OT,EA 15 MIN: HCPCS

## 2025-05-16 PROCEDURE — G0151 HHCP-SERV OF PT,EA 15 MIN: HCPCS

## 2025-05-16 PROCEDURE — G0495 RN CARE TRAIN/EDU IN HH: HCPCS

## 2025-05-16 ASSESSMENT — ENCOUNTER SYMPTOMS
PAIN LOCATION - PAIN SEVERITY: 2/10
PAIN: 3/10
PAIN LOCATION - PAIN QUALITY: DULL
PAIN LOCATION: R KNEE
PAIN LOCATION: RIGHT KNEE
PAIN SEVERITY GOAL: 2/10
SUBJECTIVE PAIN PROGRESSION: GRADUALLY IMPROVING
PAIN LOCATION - PAIN FREQUENCY: WITH ACTIVITY
LOWEST PAIN SEVERITY IN PAST 24 HOURS: 0/10
PAIN: 1
PAIN LOCATION - PAIN QUALITY: ACHE
CONSTIPATION: 1
SUBJECTIVE PAIN PROGRESSION: WAXING AND WANING
PAIN: PAIN LIMITS FUNCTION DAILY NOT CONSTANT
LOWEST PAIN SEVERITY IN PAST 24 HOURS: 0/10
PAIN: 1
HIGHEST PAIN SEVERITY IN PAST 24 HOURS: 10/10
DEBILITATING PAIN: 1
NAUSEA: DENIES
PAIN LOCATION - PAIN DURATION: ACUTE
VOMITING: DENIES
PAIN LOCATION - PAIN FREQUENCY: INTERMITTENT
LAST BOWEL MOVEMENT: 67341
STOOL FREQUENCY: LESS THAN DAILY
PAIN LOCATION - PAIN DURATION: DAILY
PAIN LOCATION - EXACERBATING FACTORS: ACTIVITY
PAIN LOCATION - RELIEVING FACTORS: REST
PAIN: 1
PAIN LOCATION - EXACERBATING FACTORS: MOVEMENT, WEIGHTBEARING
PAIN SEVERITY GOAL: 0/10
PAIN LOCATION - PAIN SEVERITY: 3/10
HIGHEST PAIN SEVERITY IN PAST 24 HOURS: 3/10
PAIN LOCATION - RELIEVING FACTORS: MEDICATION, ICE, REST

## 2025-05-17 PROCEDURE — 665998 HH PPS REVENUE CREDIT

## 2025-05-17 PROCEDURE — 665999 HH PPS REVENUE DEBIT

## 2025-05-18 PROCEDURE — 665999 HH PPS REVENUE DEBIT

## 2025-05-18 PROCEDURE — 665998 HH PPS REVENUE CREDIT

## 2025-05-19 ENCOUNTER — HOME CARE VISIT (OUTPATIENT)
Dept: HOME HEALTH SERVICES | Facility: HOME HEALTHCARE | Age: 85
End: 2025-05-19
Payer: MEDICARE

## 2025-05-19 VITALS
HEART RATE: 89 BPM | OXYGEN SATURATION: 94 % | RESPIRATION RATE: 16 BRPM | DIASTOLIC BLOOD PRESSURE: 58 MMHG | SYSTOLIC BLOOD PRESSURE: 92 MMHG | TEMPERATURE: 98.4 F

## 2025-05-19 PROCEDURE — 665998 HH PPS REVENUE CREDIT

## 2025-05-19 PROCEDURE — G0152 HHCP-SERV OF OT,EA 15 MIN: HCPCS

## 2025-05-19 PROCEDURE — 665999 HH PPS REVENUE DEBIT

## 2025-05-19 ASSESSMENT — ENCOUNTER SYMPTOMS
PAIN SEVERITY GOAL: 0/10
PAIN LOCATION: RIGHT KNEE
PAIN LOCATION - PAIN FREQUENCY: FREQUENT
LOWEST PAIN SEVERITY IN PAST 24 HOURS: 0/10
PAIN LOCATION - PAIN QUALITY: ACHING
PAIN LOCATION - PAIN SEVERITY: 3/10
PAIN: 1
SUBJECTIVE PAIN PROGRESSION: UNCHANGED
PAIN LOCATION - PAIN DURATION: SINCE SURGERY
PAIN LOCATION - EXACERBATING FACTORS: RANGE OF MOTION
HIGHEST PAIN SEVERITY IN PAST 24 HOURS: 10/10
PAIN LOCATION - RELIEVING FACTORS: REST

## 2025-05-19 ASSESSMENT — ACTIVITIES OF DAILY LIVING (ADL): OASIS_M1830: 05

## 2025-05-20 ENCOUNTER — HOME CARE VISIT (OUTPATIENT)
Dept: HOME HEALTH SERVICES | Facility: HOME HEALTHCARE | Age: 85
End: 2025-05-20
Payer: MEDICARE

## 2025-05-20 VITALS
RESPIRATION RATE: 16 BRPM | DIASTOLIC BLOOD PRESSURE: 58 MMHG | OXYGEN SATURATION: 96 % | HEART RATE: 68 BPM | SYSTOLIC BLOOD PRESSURE: 98 MMHG | TEMPERATURE: 97.5 F

## 2025-05-20 VITALS
RESPIRATION RATE: 16 BRPM | OXYGEN SATURATION: 96 % | TEMPERATURE: 97.5 F | DIASTOLIC BLOOD PRESSURE: 58 MMHG | SYSTOLIC BLOOD PRESSURE: 98 MMHG | HEART RATE: 68 BPM

## 2025-05-20 PROCEDURE — G0299 HHS/HOSPICE OF RN EA 15 MIN: HCPCS

## 2025-05-20 PROCEDURE — 665999 HH PPS REVENUE DEBIT

## 2025-05-20 PROCEDURE — 665998 HH PPS REVENUE CREDIT

## 2025-05-20 PROCEDURE — G0151 HHCP-SERV OF PT,EA 15 MIN: HCPCS

## 2025-05-20 ASSESSMENT — ENCOUNTER SYMPTOMS
CONSTIPATION: 1
SUBJECTIVE PAIN PROGRESSION: GRADUALLY IMPROVING
STOOL FREQUENCY: DAILY
PAIN LOCATION - PAIN SEVERITY: 3/10
PAIN LOCATION - RELIEVING FACTORS: REST, MEDICATION, COLD
PAIN LOCATION: RIGHT KNEE
PAIN LOCATION - PAIN FREQUENCY: CONSTANT
PAIN SEVERITY GOAL: 1/10
SUBJECTIVE PAIN PROGRESSION: WAXING AND WANING
LOWEST PAIN SEVERITY IN PAST 24 HOURS: 0/10
VOMITING: DENIES
LOWEST PAIN SEVERITY IN PAST 24 HOURS: 3/10
PAIN LOCATION - PAIN QUALITY: ACHE, DULL
NAUSEA: DENIES
MUSCLE WEAKNESS: 1
HIGHEST PAIN SEVERITY IN PAST 24 HOURS: 6/10
PAIN SEVERITY GOAL: 1/10
PAIN: 1
HIGHEST PAIN SEVERITY IN PAST 24 HOURS: 3/10
PAIN: 1
PAIN LOCATION: R KNEE
PAIN LOCATION - EXACERBATING FACTORS: WALKING
LAST BOWEL MOVEMENT: 67345
PAIN LOCATION - PAIN FREQUENCY: FREQUENT
PAIN LOCATION - PAIN SEVERITY: 3/10
PAIN LOCATION - RELIEVING FACTORS: PAIN MEDICATION
PAIN LOCATION - PAIN QUALITY: DULL ACHY
PAIN LOCATION - PAIN DURATION: DAILY
ASSOCIATED SYMPTOMS: DENIES
PAIN LOCATION - EXACERBATING FACTORS: MOVEMENT, WEIGHTBEARING
PAIN: PAIN DOES NOT LIMIT
LOWER EXTREMITY EDEMA: 1

## 2025-05-20 ASSESSMENT — PATIENT HEALTH QUESTIONNAIRE - PHQ9: CLINICAL INTERPRETATION OF PHQ2 SCORE: 0

## 2025-05-21 PROCEDURE — 665999 HH PPS REVENUE DEBIT

## 2025-05-21 PROCEDURE — 665998 HH PPS REVENUE CREDIT

## 2025-05-22 ENCOUNTER — HOME CARE VISIT (OUTPATIENT)
Dept: HOME HEALTH SERVICES | Facility: HOME HEALTHCARE | Age: 85
End: 2025-05-22
Payer: MEDICARE

## 2025-05-22 VITALS
SYSTOLIC BLOOD PRESSURE: 110 MMHG | TEMPERATURE: 98 F | RESPIRATION RATE: 16 BRPM | DIASTOLIC BLOOD PRESSURE: 56 MMHG | HEART RATE: 72 BPM | OXYGEN SATURATION: 96 %

## 2025-05-22 VITALS
DIASTOLIC BLOOD PRESSURE: 58 MMHG | SYSTOLIC BLOOD PRESSURE: 90 MMHG | HEART RATE: 79 BPM | OXYGEN SATURATION: 95 % | RESPIRATION RATE: 16 BRPM | TEMPERATURE: 97.9 F

## 2025-05-22 PROCEDURE — G0156 HHCP-SVS OF AIDE,EA 15 MIN: HCPCS

## 2025-05-22 PROCEDURE — 665999 HH PPS REVENUE DEBIT

## 2025-05-22 PROCEDURE — G0151 HHCP-SERV OF PT,EA 15 MIN: HCPCS

## 2025-05-22 PROCEDURE — 665998 HH PPS REVENUE CREDIT

## 2025-05-22 ASSESSMENT — ENCOUNTER SYMPTOMS
PAIN LOCATION - RELIEVING FACTORS: REST
PAIN LOCATION - PAIN SEVERITY: 3/10
PAIN: 1
HIGHEST PAIN SEVERITY IN PAST 24 HOURS: 6/10
PAIN LOCATION - PAIN SEVERITY: 1/10
SUBJECTIVE PAIN PROGRESSION: UNCHANGED
PAIN LOCATION - EXACERBATING FACTORS: MOVEMENT
LOWEST PAIN SEVERITY IN PAST 24 HOURS: 0/10
PAIN LOCATION: RIGHT KNEE
PAIN: PAIN NOT LIMITING
LOWEST PAIN SEVERITY IN PAST 24 HOURS: 0/10
PAIN LOCATION - PAIN DURATION: DAILY
SUBJECTIVE PAIN PROGRESSION: WAXING AND WANING
PAIN LOCATION - PAIN FREQUENCY: FREQUENT
PAIN LOCATION: R KNEE
PAIN SEVERITY GOAL: 0/10
PAIN LOCATION - EXACERBATING FACTORS: AMBULATION
PAIN LOCATION - PAIN QUALITY: ACHING
PAIN SEVERITY GOAL: 3/10
HIGHEST PAIN SEVERITY IN PAST 24 HOURS: 8/10
PAIN LOCATION - PAIN DURATION: SINCE SURGERY
PAIN LOCATION - PAIN FREQUENCY: FREQUENT
PAIN: 1
PAIN LOCATION - PAIN QUALITY: DULL

## 2025-05-23 ENCOUNTER — HOME CARE VISIT (OUTPATIENT)
Dept: HOME HEALTH SERVICES | Facility: HOME HEALTHCARE | Age: 85
End: 2025-05-23
Payer: MEDICARE

## 2025-05-23 VITALS
OXYGEN SATURATION: 96 % | HEART RATE: 72 BPM | DIASTOLIC BLOOD PRESSURE: 56 MMHG | TEMPERATURE: 98 F | SYSTOLIC BLOOD PRESSURE: 110 MMHG | RESPIRATION RATE: 16 BRPM

## 2025-05-23 PROCEDURE — 665999 HH PPS REVENUE DEBIT

## 2025-05-23 PROCEDURE — 665998 HH PPS REVENUE CREDIT

## 2025-05-23 PROCEDURE — G0152 HHCP-SERV OF OT,EA 15 MIN: HCPCS

## 2025-05-23 ASSESSMENT — ENCOUNTER SYMPTOMS: DENIES PAIN: 1

## 2025-05-24 ENCOUNTER — HOME CARE VISIT (OUTPATIENT)
Dept: HOME HEALTH SERVICES | Facility: HOME HEALTHCARE | Age: 85
End: 2025-05-24
Payer: MEDICARE

## 2025-05-24 VITALS
HEART RATE: 77 BPM | OXYGEN SATURATION: 98 % | SYSTOLIC BLOOD PRESSURE: 98 MMHG | TEMPERATURE: 98.5 F | DIASTOLIC BLOOD PRESSURE: 60 MMHG | RESPIRATION RATE: 16 BRPM

## 2025-05-24 PROCEDURE — G0157 HHC PT ASSISTANT EA 15: HCPCS | Mod: CQ

## 2025-05-24 PROCEDURE — 665998 HH PPS REVENUE CREDIT

## 2025-05-24 PROCEDURE — 665999 HH PPS REVENUE DEBIT

## 2025-05-24 ASSESSMENT — ENCOUNTER SYMPTOMS
PAIN: 1
PAIN SEVERITY GOAL: 0/10
PAIN LOCATION: R KNEE
SUBJECTIVE PAIN PROGRESSION: GRADUALLY IMPROVING
LOWEST PAIN SEVERITY IN PAST 24 HOURS: 1/10
HIGHEST PAIN SEVERITY IN PAST 24 HOURS: 2/10

## 2025-05-25 PROCEDURE — 665999 HH PPS REVENUE DEBIT

## 2025-05-25 PROCEDURE — 665998 HH PPS REVENUE CREDIT

## 2025-05-26 ENCOUNTER — HOME CARE VISIT (OUTPATIENT)
Dept: HOME HEALTH SERVICES | Facility: HOME HEALTHCARE | Age: 85
End: 2025-05-26
Payer: MEDICARE

## 2025-05-26 VITALS
DIASTOLIC BLOOD PRESSURE: 60 MMHG | HEART RATE: 67 BPM | OXYGEN SATURATION: 96 % | RESPIRATION RATE: 16 BRPM | SYSTOLIC BLOOD PRESSURE: 104 MMHG | TEMPERATURE: 97.5 F

## 2025-05-26 VITALS
DIASTOLIC BLOOD PRESSURE: 60 MMHG | OXYGEN SATURATION: 98 % | SYSTOLIC BLOOD PRESSURE: 100 MMHG | RESPIRATION RATE: 16 BRPM | HEART RATE: 69 BPM | TEMPERATURE: 98.5 F

## 2025-05-26 PROCEDURE — 665999 HH PPS REVENUE DEBIT

## 2025-05-26 PROCEDURE — G0157 HHC PT ASSISTANT EA 15: HCPCS | Mod: CQ

## 2025-05-26 PROCEDURE — 665998 HH PPS REVENUE CREDIT

## 2025-05-26 ASSESSMENT — ENCOUNTER SYMPTOMS
HIGHEST PAIN SEVERITY IN PAST 24 HOURS: 2/10
PAIN LOCATION - EXACERBATING FACTORS: MOVEMENT
SUBJECTIVE PAIN PROGRESSION: GRADUALLY IMPROVING
PAIN SEVERITY GOAL: 0/10
HIGHEST PAIN SEVERITY IN PAST 24 HOURS: 2/10
PAIN SEVERITY GOAL: 0/10
SUBJECTIVE PAIN PROGRESSION: UNCHANGED
PAIN: 1
LOWEST PAIN SEVERITY IN PAST 24 HOURS: 0/10
PAIN: 1
PAIN LOCATION: R KNEE
PAIN LOCATION: RIGHT KNEE
PAIN LOCATION - PAIN QUALITY: ACHING
PAIN LOCATION - PAIN DURATION: SINCE SURGERY
PAIN LOCATION - PAIN SEVERITY: 2/10
LOWEST PAIN SEVERITY IN PAST 24 HOURS: 1/10
PAIN LOCATION - PAIN FREQUENCY: INTERMITTENT

## 2025-05-27 ENCOUNTER — HOME CARE VISIT (OUTPATIENT)
Dept: HOME HEALTH SERVICES | Facility: HOME HEALTHCARE | Age: 85
End: 2025-05-27
Payer: MEDICARE

## 2025-05-27 VITALS
RESPIRATION RATE: 16 BRPM | DIASTOLIC BLOOD PRESSURE: 66 MMHG | HEART RATE: 68 BPM | TEMPERATURE: 98.4 F | SYSTOLIC BLOOD PRESSURE: 104 MMHG | OXYGEN SATURATION: 98 %

## 2025-05-27 VITALS
RESPIRATION RATE: 16 BRPM | HEART RATE: 68 BPM | DIASTOLIC BLOOD PRESSURE: 66 MMHG | TEMPERATURE: 98.4 F | SYSTOLIC BLOOD PRESSURE: 104 MMHG | OXYGEN SATURATION: 98 %

## 2025-05-27 VITALS
DIASTOLIC BLOOD PRESSURE: 60 MMHG | SYSTOLIC BLOOD PRESSURE: 110 MMHG | RESPIRATION RATE: 18 BRPM | HEART RATE: 74 BPM | TEMPERATURE: 97.8 F | OXYGEN SATURATION: 98 %

## 2025-05-27 PROCEDURE — 665998 HH PPS REVENUE CREDIT

## 2025-05-27 PROCEDURE — 665999 HH PPS REVENUE DEBIT

## 2025-05-27 PROCEDURE — G0151 HHCP-SERV OF PT,EA 15 MIN: HCPCS

## 2025-05-27 PROCEDURE — G0156 HHCP-SVS OF AIDE,EA 15 MIN: HCPCS

## 2025-05-27 PROCEDURE — G0299 HHS/HOSPICE OF RN EA 15 MIN: HCPCS

## 2025-05-27 ASSESSMENT — ENCOUNTER SYMPTOMS
LOWEST PAIN SEVERITY IN PAST 24 HOURS: 1/10
ASSOCIATED SYMPTOMS: DENIES
BOWEL PATTERN NORMAL: 1
PAIN LOCATION - PAIN QUALITY: ACHE
PAIN LOCATION - PAIN FREQUENCY: WITH ACTIVITY
PAIN LOCATION: RIGHT KNEE
PAIN LOCATION - PAIN QUALITY: DULL, ACHY
PAIN: PAIN NOT LIMITING
LOWEST PAIN SEVERITY IN PAST 24 HOURS: 0/10
PAIN LOCATION - RELIEVING FACTORS: PAIN MEDICATION
PAIN LOCATION - PAIN FREQUENCY: FREQUENT
PAIN: 1
SUBJECTIVE PAIN PROGRESSION: GRADUALLY IMPROVING
DENIES PAIN: 1
PAIN LOCATION - PAIN SEVERITY: 1/10
HIGHEST PAIN SEVERITY IN PAST 24 HOURS: 6/10
HIGHEST PAIN SEVERITY IN PAST 24 HOURS: 4/10
STOOL FREQUENCY: DAILY
PAIN LOCATION - PAIN SEVERITY: 1/10
PAIN LOCATION: R KNEE
SUBJECTIVE PAIN PROGRESSION: WAXING AND WANING
PAIN SEVERITY GOAL: 2/10
LAST BOWEL MOVEMENT: 67352
LOWER EXTREMITY EDEMA: 1
PAIN SEVERITY GOAL: 0/10
PAIN LOCATION - PAIN DURATION: DAILY
PAIN: 1

## 2025-05-27 ASSESSMENT — PATIENT HEALTH QUESTIONNAIRE - PHQ9: CLINICAL INTERPRETATION OF PHQ2 SCORE: 0

## 2025-05-27 ASSESSMENT — ACTIVITIES OF DAILY LIVING (ADL)
BATHING_WITHIN_DEFINED_LIMITS: 1
USING THE TELPHONE: INDEPENDENT
DRINKING_FROM_CUP_CURRENT_FUNCTIONINDEPENDENT: 1
TELEPHONE USE ASSESSED: 1

## 2025-05-28 ENCOUNTER — HOME CARE VISIT (OUTPATIENT)
Dept: HOME HEALTH SERVICES | Facility: HOME HEALTHCARE | Age: 85
End: 2025-05-28
Payer: MEDICARE

## 2025-05-28 PROCEDURE — G0152 HHCP-SERV OF OT,EA 15 MIN: HCPCS

## 2025-05-28 PROCEDURE — 665999 HH PPS REVENUE DEBIT

## 2025-05-28 PROCEDURE — 665998 HH PPS REVENUE CREDIT

## 2025-05-29 ENCOUNTER — HOME CARE VISIT (OUTPATIENT)
Dept: HOME HEALTH SERVICES | Facility: HOME HEALTHCARE | Age: 85
End: 2025-05-29
Payer: MEDICARE

## 2025-05-29 VITALS
RESPIRATION RATE: 18 BRPM | TEMPERATURE: 98 F | SYSTOLIC BLOOD PRESSURE: 100 MMHG | DIASTOLIC BLOOD PRESSURE: 60 MMHG | HEART RATE: 64 BPM | OXYGEN SATURATION: 91 %

## 2025-05-29 VITALS
HEART RATE: 72 BPM | DIASTOLIC BLOOD PRESSURE: 72 MMHG | OXYGEN SATURATION: 97 % | RESPIRATION RATE: 16 BRPM | TEMPERATURE: 97.3 F | SYSTOLIC BLOOD PRESSURE: 122 MMHG

## 2025-05-29 PROCEDURE — 665998 HH PPS REVENUE CREDIT

## 2025-05-29 PROCEDURE — G0151 HHCP-SERV OF PT,EA 15 MIN: HCPCS

## 2025-05-29 PROCEDURE — 665999 HH PPS REVENUE DEBIT

## 2025-05-29 ASSESSMENT — ENCOUNTER SYMPTOMS
SUBJECTIVE PAIN PROGRESSION: UNCHANGED
PAIN: 1
PAIN LOCATION - PAIN FREQUENCY: WITH ACTIVITY
PAIN LOCATION - PAIN QUALITY: ACHE
HIGHEST PAIN SEVERITY IN PAST 24 HOURS: 4/10
LOWEST PAIN SEVERITY IN PAST 24 HOURS: 0/10
PAIN LOCATION - EXACERBATING FACTORS: MOVEMENT
PAIN LOCATION - PAIN FREQUENCY: INTERMITTENT
PAIN: PAIN DOES NOT LIMIT
LOWEST PAIN SEVERITY IN PAST 24 HOURS: 0/10
HIGHEST PAIN SEVERITY IN PAST 24 HOURS: 5/10
PAIN LOCATION - PAIN DURATION: DAILY
SUBJECTIVE PAIN PROGRESSION: WAXING AND WANING
PAIN LOCATION - PAIN DURATION: SINCE SURGERY
PAIN LOCATION - PAIN SEVERITY: 1/10
PAIN: 1
PAIN SEVERITY GOAL: 0/10
PAIN LOCATION - PAIN QUALITY: ACHING
PAIN LOCATION - PAIN SEVERITY: 2/10
PAIN LOCATION: RIGHT KNEE
PAIN SEVERITY GOAL: 0/10
PAIN LOCATION: R KNEE

## 2025-05-29 ASSESSMENT — ACTIVITIES OF DAILY LIVING (ADL)
HOME_HEALTH_OASIS: 00
OASIS_M1830: 01

## 2025-05-29 ASSESSMENT — PATIENT HEALTH QUESTIONNAIRE - PHQ9: CLINICAL INTERPRETATION OF PHQ2 SCORE: 0

## 2025-05-29 NOTE — Clinical Note
noted  ----- Message -----  From: Crystal Claudio, PT  Sent: 5/29/2025   1:47 PM PDT  To: Miriam Washington R.N.; Elke Rodriguez R.N.;*      Pt D/Josesito from agency as planned with all goals met - ready for out pt P.T.    DISCHARGE NARRATIVE    CURRENT LEVEL OF FUNCTION:   Ambulation and Mobility: Independent   Patient Equipment: cane for ambulation always outside home and intermittent use in home (due to altered gait pattern)  Transferring: Independent  Bathing: Independent  Dressing: Independent  Special equipment used: shower stool, grab bar, SPC, FWW, hand held shower    MEDICATION MANAGEMENT:  Able to take independently  Medication issues:   In the last 24 hours did the patient wear oxygen: no.  In the last 24 hours, when is the patient dyspneic or noticeably   Short of Breath : patient is not short of breath .    DISCHARGE/TRANSITION PLAN  The discharge plan for home health is to discharge back to the community when individualized goals have been met.  Individualized goals during this episode of Home Health care were related to: mobility, ADLs, medication management, wound care, fall prevention, ROM .    Patient has met goals related to: all    Goals not met and why na    Patient is ready for discharge on 5/29/2025 with the following services in place: out pt P.T.

## 2025-05-29 NOTE — DISCHARGE SUMMARY
DISCHARGE NARRATIVE    CURRENT LEVEL OF FUNCTION:   Ambulation and Mobility: Independent   Patient Equipment: cane for ambulation always outside home and intermittent use in home (due to altered gait pattern)  Transferring: Independent  Bathing: Independent  Dressing: Independent  Special equipment used: shower stool, grab bar, SPC, FWW, hand held shower    MEDICATION MANAGEMENT:  Able to take independently  Medication issues:   In the last 24 hours did the patient wear oxygen: no.  In the last 24 hours, when is the patient dyspneic or noticeably   Short of Breath : patient is not short of breath .    DISCHARGE/TRANSITION PLAN  The discharge plan for home health is to discharge back to the community when individualized goals have been met.  Individualized goals during this episode of Home Health care were related to: mobility, ADLs, medication management, wound care, fall prevention, ROM .    Patient has met goals related to: all    Goals not met and why na    Patient is ready for discharge on 5/29/2025 with the following services in place: out pt P.T.

## 2025-05-29 NOTE — Clinical Note
Pt D/Josesito from agency as planned with all goals met - ready for out pt P.T.    DISCHARGE NARRATIVE    CURRENT LEVEL OF FUNCTION:   Ambulation and Mobility: Independent   Patient Equipment: cane for ambulation always outside home and intermittent use in home (due to altered gait pattern)  Transferring: Independent  Bathing: Independent  Dressing: Independent  Special equipment used: shower stool, grab bar, SPC, FWW, hand held shower    MEDICATION MANAGEMENT:  Able to take independently  Medication issues:   In the last 24 hours did the patient wear oxygen: no.  In the last 24 hours, when is the patient dyspneic or noticeably   Short of Breath : patient is not short of breath .    DISCHARGE/TRANSITION PLAN  The discharge plan for home health is to discharge back to the community when individualized goals have been met.  Individualized goals during this episode of Home Health care were related to: mobility, ADLs, medication management, wound care, fall prevention, ROM .    Patient has met goals related to: all    Goals not met and why na    Patient is ready for discharge on 5/29/2025 with the following services in place: out pt P.T.

## 2025-05-30 PROCEDURE — 665999 HH PPS REVENUE DEBIT

## 2025-05-30 PROCEDURE — 665998 HH PPS REVENUE CREDIT
